# Patient Record
Sex: FEMALE | Race: WHITE | NOT HISPANIC OR LATINO | Employment: UNEMPLOYED | URBAN - METROPOLITAN AREA
[De-identification: names, ages, dates, MRNs, and addresses within clinical notes are randomized per-mention and may not be internally consistent; named-entity substitution may affect disease eponyms.]

---

## 2024-01-25 ENCOUNTER — HOSPITAL ENCOUNTER (EMERGENCY)
Facility: HOSPITAL | Age: 29
Discharge: HOME | End: 2024-01-25
Attending: EMERGENCY MEDICINE
Payer: COMMERCIAL

## 2024-01-25 ENCOUNTER — APPOINTMENT (EMERGENCY)
Dept: CT IMAGING | Facility: HOSPITAL | Age: 29
End: 2024-01-25
Payer: COMMERCIAL

## 2024-01-25 ENCOUNTER — APPOINTMENT (EMERGENCY)
Dept: RADIOLOGY | Facility: HOSPITAL | Age: 29
End: 2024-01-25
Payer: COMMERCIAL

## 2024-01-25 VITALS
SYSTOLIC BLOOD PRESSURE: 141 MMHG | RESPIRATION RATE: 18 BRPM | DIASTOLIC BLOOD PRESSURE: 90 MMHG | OXYGEN SATURATION: 95 % | WEIGHT: 293 LBS | TEMPERATURE: 97.9 F | HEART RATE: 94 BPM

## 2024-01-25 DIAGNOSIS — R56.9 SEIZURE (HCC): Primary | ICD-10-CM

## 2024-01-25 LAB
ALBUMIN SERPL BCP-MCNC: 4.4 G/DL (ref 3.5–5)
ALP SERPL-CCNC: 75 U/L (ref 34–104)
ALT SERPL W P-5'-P-CCNC: 29 U/L (ref 7–52)
ANION GAP SERPL CALCULATED.3IONS-SCNC: 23 MMOL/L
AST SERPL W P-5'-P-CCNC: 26 U/L (ref 13–39)
BACTERIA UR QL AUTO: ABNORMAL /HPF
BASOPHILS # BLD AUTO: 0.04 THOUSANDS/ÂΜL (ref 0–0.1)
BASOPHILS NFR BLD AUTO: 0 % (ref 0–1)
BILIRUB SERPL-MCNC: 0.61 MG/DL (ref 0.2–1)
BILIRUB UR QL STRIP: NEGATIVE
BUN SERPL-MCNC: 9 MG/DL (ref 5–25)
CALCIUM SERPL-MCNC: 9.5 MG/DL (ref 8.4–10.2)
CHLORIDE SERPL-SCNC: 98 MMOL/L (ref 96–108)
CLARITY UR: ABNORMAL
CO2 SERPL-SCNC: 16 MMOL/L (ref 21–32)
COLOR UR: YELLOW
CREAT SERPL-MCNC: 0.87 MG/DL (ref 0.6–1.3)
EOSINOPHIL # BLD AUTO: 0.07 THOUSAND/ÂΜL (ref 0–0.61)
EOSINOPHIL NFR BLD AUTO: 1 % (ref 0–6)
ERYTHROCYTE [DISTWIDTH] IN BLOOD BY AUTOMATED COUNT: 14.2 % (ref 11.6–15.1)
EXT PREGNANCY TEST URINE: NEGATIVE
EXT. CONTROL: NORMAL
GFR SERPL CREATININE-BSD FRML MDRD: 89 ML/MIN/1.73SQ M
GLUCOSE SERPL-MCNC: 140 MG/DL (ref 65–140)
GLUCOSE SERPL-MCNC: 146 MG/DL (ref 65–140)
GLUCOSE UR STRIP-MCNC: NEGATIVE MG/DL
HCT VFR BLD AUTO: 39.9 % (ref 34.8–46.1)
HGB BLD-MCNC: 12.4 G/DL (ref 11.5–15.4)
HGB UR QL STRIP.AUTO: ABNORMAL
IMM GRANULOCYTES # BLD AUTO: 0.14 THOUSAND/UL (ref 0–0.2)
IMM GRANULOCYTES NFR BLD AUTO: 1 % (ref 0–2)
KETONES UR STRIP-MCNC: ABNORMAL MG/DL
LEUKOCYTE ESTERASE UR QL STRIP: NEGATIVE
LYMPHOCYTES # BLD AUTO: 3.78 THOUSANDS/ÂΜL (ref 0.6–4.47)
LYMPHOCYTES NFR BLD AUTO: 30 % (ref 14–44)
MCH RBC QN AUTO: 26.7 PG (ref 26.8–34.3)
MCHC RBC AUTO-ENTMCNC: 31.1 G/DL (ref 31.4–37.4)
MCV RBC AUTO: 86 FL (ref 82–98)
MONOCYTES # BLD AUTO: 1.07 THOUSAND/ÂΜL (ref 0.17–1.22)
MONOCYTES NFR BLD AUTO: 9 % (ref 4–12)
NEUTROPHILS # BLD AUTO: 7.51 THOUSANDS/ÂΜL (ref 1.85–7.62)
NEUTS SEG NFR BLD AUTO: 59 % (ref 43–75)
NITRITE UR QL STRIP: NEGATIVE
NON-SQ EPI CELLS URNS QL MICRO: ABNORMAL /HPF
NRBC BLD AUTO-RTO: 0 /100 WBCS
PH UR STRIP.AUTO: 6 [PH]
PLATELET # BLD AUTO: 428 THOUSANDS/UL (ref 149–390)
PMV BLD AUTO: 11.3 FL (ref 8.9–12.7)
POTASSIUM SERPL-SCNC: 3.6 MMOL/L (ref 3.5–5.3)
PROLACTIN SERPL-MCNC: 87.6 NG/ML (ref 3.34–26.72)
PROT SERPL-MCNC: 8 G/DL (ref 6.4–8.4)
PROT UR STRIP-MCNC: ABNORMAL MG/DL
RBC # BLD AUTO: 4.65 MILLION/UL (ref 3.81–5.12)
RBC #/AREA URNS AUTO: ABNORMAL /HPF
SODIUM SERPL-SCNC: 137 MMOL/L (ref 135–147)
SP GR UR STRIP.AUTO: 1.02 (ref 1–1.03)
UROBILINOGEN UR STRIP-ACNC: <2 MG/DL
WBC # BLD AUTO: 12.61 THOUSAND/UL (ref 4.31–10.16)
WBC #/AREA URNS AUTO: ABNORMAL /HPF

## 2024-01-25 PROCEDURE — 84146 ASSAY OF PROLACTIN: CPT | Performed by: EMERGENCY MEDICINE

## 2024-01-25 PROCEDURE — 36415 COLL VENOUS BLD VENIPUNCTURE: CPT | Performed by: EMERGENCY MEDICINE

## 2024-01-25 PROCEDURE — 81001 URINALYSIS AUTO W/SCOPE: CPT | Performed by: EMERGENCY MEDICINE

## 2024-01-25 PROCEDURE — 82948 REAGENT STRIP/BLOOD GLUCOSE: CPT

## 2024-01-25 PROCEDURE — 85025 COMPLETE CBC W/AUTO DIFF WBC: CPT | Performed by: EMERGENCY MEDICINE

## 2024-01-25 PROCEDURE — 80053 COMPREHEN METABOLIC PANEL: CPT | Performed by: EMERGENCY MEDICINE

## 2024-01-25 PROCEDURE — 70450 CT HEAD/BRAIN W/O DYE: CPT

## 2024-01-25 PROCEDURE — 99285 EMERGENCY DEPT VISIT HI MDM: CPT | Performed by: EMERGENCY MEDICINE

## 2024-01-25 PROCEDURE — 81025 URINE PREGNANCY TEST: CPT | Performed by: EMERGENCY MEDICINE

## 2024-01-25 PROCEDURE — 99284 EMERGENCY DEPT VISIT MOD MDM: CPT

## 2024-01-25 PROCEDURE — 93005 ELECTROCARDIOGRAM TRACING: CPT

## 2024-01-25 PROCEDURE — 71045 X-RAY EXAM CHEST 1 VIEW: CPT

## 2024-01-25 RX ORDER — LEVETIRACETAM 750 MG/1
750 TABLET ORAL 2 TIMES DAILY
Qty: 60 TABLET | Refills: 0 | Status: SHIPPED | OUTPATIENT
Start: 2024-01-25 | End: 2024-02-24

## 2024-01-25 RX ADMIN — LEVETIRACETAM 750 MG: 250 TABLET, FILM COATED ORAL at 10:34

## 2024-01-25 NOTE — ED PROVIDER NOTES
History  Chief Complaint   Patient presents with    Seizure - Prior Hx Of     Patient presents to the ED via EMS, states patient was in the back seat of car with parents driving, states she had a 30 second seizure, EMS reports post ictal upon arrival, patient AAOx4 currently in ED. States hx of seizures, last one when she was 3      28-year-old female presents for evaluation of witnessed seizure that occurred shortly prior to arrival.  Patient with a history of seizures when she was 3 years old and was on medications for approximately 1 year but eventually discontinued it due to patient doing well.  No other known medical problems.  Patient denies any recent illness and otherwise felt well prior to incidence.  Patient was in the backseat of a car with her parents who noticed full body shaking like activity that lasted approximately 30 seconds.  Patient was postictal and now back to baseline.        None       Past Medical History:   Diagnosis Date    Seizure (HCC)        History reviewed. No pertinent surgical history.    History reviewed. No pertinent family history.  I have reviewed and agree with the history as documented.    E-Cigarette/Vaping     E-Cigarette/Vaping Substances     Social History     Tobacco Use    Smoking status: Never    Smokeless tobacco: Never   Substance Use Topics    Alcohol use: Not Currently    Drug use: Not Currently       Review of Systems   Constitutional:  Negative for fever.   Neurological:  Positive for seizures.       Physical Exam  Physical Exam  Vitals and nursing note reviewed.   Constitutional:       Appearance: She is well-developed.   HENT:      Head: Normocephalic and atraumatic.      Right Ear: External ear normal.      Left Ear: External ear normal.      Nose: Nose normal.   Eyes:      General: No scleral icterus.  Cardiovascular:      Rate and Rhythm: Normal rate.   Pulmonary:      Effort: Pulmonary effort is normal. No respiratory distress.   Abdominal:      General:  There is no distension.   Musculoskeletal:         General: No deformity. Normal range of motion.      Cervical back: Normal range of motion.   Skin:     Findings: No rash.   Neurological:      General: No focal deficit present.      Mental Status: She is alert and oriented to person, place, and time.   Psychiatric:         Mood and Affect: Mood normal.         Vital Signs  ED Triage Vitals   Temperature Pulse Respirations Blood Pressure SpO2   01/25/24 0938 01/25/24 0936 01/25/24 0936 01/25/24 0936 01/25/24 0936   97.9 °F (36.6 °C) (!) 140 18 128/64 97 %      Temp Source Heart Rate Source Patient Position - Orthostatic VS BP Location FiO2 (%)   01/25/24 0938 01/25/24 0936 01/25/24 0936 01/25/24 0936 --   Oral Monitor Sitting Right arm       Pain Score       01/25/24 0936       No Pain           Vitals:    01/25/24 0936 01/25/24 1030 01/25/24 1202   BP: 128/64 141/90    Pulse: (!) 140 (!) 117 94   Patient Position - Orthostatic VS: Sitting Sitting          Visual Acuity      ED Medications  Medications   levETIRAcetam (KEPPRA) tablet 750 mg (750 mg Oral Given 1/25/24 1034)       Diagnostic Studies  Results Reviewed       Procedure Component Value Units Date/Time    Urine Microscopic [[id number]]  (Abnormal) Collected: 01/25/24 1028    Lab Status: Final result Specimen: Urine, Clean Catch Updated: 01/25/24 1105     RBC, UA 10-20 /hpf      WBC, UA None Seen /hpf      Epithelial Cells Occasional /hpf      Bacteria, UA None Seen /hpf     UA w Reflex to Microscopic w Reflex to Culture [[id number]]  (Abnormal) Collected: 01/25/24 1028    Lab Status: Final result Specimen: Urine, Clean Catch Updated: 01/25/24 1100     Color, UA Yellow     Clarity, UA Cloudy     Specific Gravity, UA 1.025     pH, UA 6.0     Leukocytes, UA Negative     Nitrite, UA Negative     Protein, UA Trace mg/dl      Glucose, UA Negative mg/dl      Ketones, UA 10 (1+) mg/dl      Urobilinogen, UA <2.0 mg/dl      Bilirubin, UA Negative     Occult Blood, UA  Large    Comprehensive metabolic panel [[id number]]  (Abnormal) Collected: 01/25/24 0949    Lab Status: Final result Specimen: Blood from Arm, Left Updated: 01/25/24 1019     Sodium 137 mmol/L      Potassium 3.6 mmol/L      Chloride 98 mmol/L      CO2 16 mmol/L      ANION GAP 23 mmol/L      BUN 9 mg/dL      Creatinine 0.87 mg/dL      Glucose 140 mg/dL      Calcium 9.5 mg/dL      AST 26 U/L      ALT 29 U/L      Alkaline Phosphatase 75 U/L      Total Protein 8.0 g/dL      Albumin 4.4 g/dL      Total Bilirubin 0.61 mg/dL      eGFR 89 ml/min/1.73sq m     Narrative:      National Kid[employer] Foundation guidelines for Chronic Kidney Disease (CKD):     Stage 1 with normal or high GFR (GFR > 90 mL/min/1.73 square meters)    Stage 2 Mild CKD (GFR = 60-89 mL/min/1.73 square meters)    Stage 3A Moderate CKD (GFR = 45-59 mL/min/1.73 square meters)    Stage 3B Moderate CKD (GFR = 30-44 mL/min/1.73 square meters)    Stage 4 Severe CKD (GFR = 15-29 mL/min/1.73 square meters)    Stage 5 End Stage CKD (GFR <15 mL/min/1.73 square meters)  Note: GFR calculation is accurate only with a steady state creatinine    CBC and differential [[id number]]  (Abnormal) Collected: 01/25/24 0949    Lab Status: Final result Specimen: Blood from Arm, Left Updated: 01/25/24 1005     WBC 12.61 Thousand/uL      RBC 4.65 Million/uL      Hemoglobin 12.4 g/dL      Hematocrit 39.9 %      MCV 86 fL      MCH 26.7 pg      MCHC 31.1 g/dL      RDW 14.2 %      MPV 11.3 fL      Platelets 428 Thousands/uL      nRBC 0 /100 WBCs      Neutrophils Relative 59 %      Immat GRANS % 1 %      Lymphocytes Relative 30 %      Monocytes Relative 9 %      Eosinophils Relative 1 %      Basophils Relative 0 %      Neutrophils Absolute 7.51 Thousands/µL      Immature Grans Absolute 0.14 Thousand/uL      Lymphocytes Absolute 3.78 Thousands/µL      Monocytes Absolute 1.07 Thousand/µL      Eosinophils Absolute 0.07 Thousand/µL      Basophils Absolute 0.04 Thousands/µL     POCT  pregnancy, urine [[id number]]  (Normal) Resulted: 01/25/24 1003    Lab Status: Final result Updated: 01/25/24 1004     EXT Preg Test, Ur Negative     Control Valid    Prolactin [[id number]] Collected: 01/25/24 0949    Lab Status: In process Specimen: Blood from Arm, Left Updated: 01/25/24 1000    Fingerstick Glucose (POCT) [[id number]]  (Abnormal) Collected: 01/25/24 0940    Lab Status: Final result Updated: 01/25/24 0941     POC Glucose 146 mg/dl                    XR chest 1 view portable   Final Result by Noel Tavarez MD (01/25 1100)      No acute cardiopulmonary disease.                  Workstation performed: [id number]         CT head wo contrast   Final Result by Matthew Bueno MD (01/25 1015)      No acute intracranial abnormality.                  Workstation performed: [id number]                    Procedures  Procedures         ED Course                               SBIRT 22yo+      Flowsheet Row Most Recent Value   Initial Alcohol Screen: US AUDIT-C     1. How often do you have a drink containing alcohol? 0 Filed at: 01/25/2024 0938   2. How many drinks containing alcohol do you have on a typical day you are drinking?  0 Filed at: 01/25/2024 0938   3a. Male UNDER 65: How often do you have five or more drinks on one occasion? 0 Filed at: 01/25/2024 0938   3b. FEMALE Any Age, or MALE 65+: How often do you have 4 or more drinks on one occassion? 0 Filed at: 01/25/2024 0938   Audit-C Score 0 Filed at: 01/25/2024 0938   CORA: How many times in the past year have you...    Used an illegal drug or used a prescription medication for non-medical reasons? Never Filed at: 01/25/2024 0938                      Medical Decision Making  28-year-old female presenting with witnessed seizure.  Obtain labs, EKG, pregnancy test, CT head.  Patient does not drive as she does not have a license.    Discussed case with neurology team.  Given prior history of headaches as a child, advised to initiate  antiepileptics Keppra 750 twice daily.  Ambulatory referral placed and will see as outpatient.    Amount and/or Complexity of Data Reviewed  Labs: ordered.  Radiology: ordered.    Risk  Prescription drug management.             Disposition  Final diagnoses:   Seizure (HCC)     Time reflects when diagnosis was documented in both MDM as applicable and the Disposition within this note       Time User Action Codes Description Comment    1/25/2024 12:02 PM Santo Vitale Add [R56.9] Seizure (HCC)           ED Disposition       ED Disposition   Discharge    Condition   Stable    Date/Time   Thu Jan 25, 2024 1202    Comment   Ailyn Nieves discharge to home/self care.                   Follow-up Information       Follow up With Specialties Details Why Contact Info Additional Information    [facility]   [address]  [address]  [facility] [address]  [phone] [facility], [address], [address], [place], [place], [address]   [phone]     [facility] Emergency Department Emergency Medicine  If symptoms worsen [address] [address] [phone] [facility] Emergency Department, [address] [place], [place] [address]            Discharge Medication List as of 1/25/2024 12:03 PM        START taking these medications    Details   levETIRAcetam (Keppra) 750 mg tablet Take 1 tablet (750 mg total) by mouth 2 (two) times a day, Starting Thu 1/25/2024, Until Sat 2/24/2024, Print                 PDMP Review       None            ED Provider  Electronically Signed by             Santo Vitale DO  01/25/24 8722

## 2024-01-26 LAB
ATRIAL RATE: 136 BPM
QRS AXIS: 80 DEGREES
QRSD INTERVAL: 76 MS
QT INTERVAL: 376 MS
QTC INTERVAL: 565 MS
T WAVE AXIS: 41 DEGREES
VENTRICULAR RATE: 136 BPM

## 2024-01-30 ENCOUNTER — TELEPHONE (OUTPATIENT)
Dept: NEUROLOGY | Facility: CLINIC | Age: 29
End: 2024-01-30

## 2024-01-30 NOTE — TELEPHONE ENCOUNTER
Pt parent returned a call regarding a referral that is needed for the insurance for appt coming up. Pt parent stated that the insurance company told her no it is not necessary and that she will not be sending one over since. Pt parent gave me the number to for  to call the insurance for them to explain to us ([phone]). I advised pt parent that just incase to call PCP and have them put a referral in her chart.     Pt parent hung up.     Pt parent number is [phone].

## 2024-02-06 NOTE — TELEPHONE ENCOUNTER
I spoke w/ Paz PATEL @ [facility].  2/6/24 PER PAZ PATEL @ [facility] - A PAPER OR ELECTRONIC REFERRAL IS NOT REQUIRED FOR PT TO SEE A SPECIALIST  REF #: [id number] 2/6/24

## 2024-02-09 ENCOUNTER — TELEPHONE (OUTPATIENT)
Dept: NEUROLOGY | Facility: CLINIC | Age: 29
End: 2024-02-09

## 2024-02-15 ENCOUNTER — CONSULT (OUTPATIENT)
Dept: NEUROLOGY | Facility: CLINIC | Age: 29
End: 2024-02-15
Payer: COMMERCIAL

## 2024-02-15 VITALS
TEMPERATURE: 97.5 F | SYSTOLIC BLOOD PRESSURE: 128 MMHG | HEIGHT: 65 IN | BODY MASS INDEX: 48.82 KG/M2 | OXYGEN SATURATION: 98 % | RESPIRATION RATE: 16 BRPM | WEIGHT: 293 LBS | DIASTOLIC BLOOD PRESSURE: 68 MMHG | HEART RATE: 111 BPM

## 2024-02-15 DIAGNOSIS — G40.409 OTHER GENERALIZED EPILEPSY, NOT INTRACTABLE, WITHOUT STATUS EPILEPTICUS (HCC): Primary | ICD-10-CM

## 2024-02-15 DIAGNOSIS — R56.9 SEIZURE (HCC): ICD-10-CM

## 2024-02-15 PROBLEM — G40.909 EPILEPSY (HCC): Status: ACTIVE | Noted: 2024-02-15

## 2024-02-15 PROCEDURE — 99245 OFF/OP CONSLTJ NEW/EST HI 55: CPT | Performed by: PSYCHIATRY & NEUROLOGY

## 2024-02-15 RX ORDER — LEVETIRACETAM 750 MG/1
750 TABLET ORAL 2 TIMES DAILY
Qty: 180 TABLET | Refills: 1 | Status: SHIPPED | OUTPATIENT
Start: 2024-02-15

## 2024-02-15 NOTE — PATIENT INSTRUCTIONS
Plan:   Epilepsy, suspect underlying generalized epilepsy based on the history of absence and generalized tonic clonic seizures  - continue with levetiracetam 750mg twice a day  - MRI brain study with seizure protocol, with and without contrast, please have the MRI study done on a [employer] Machine.  - check BMP and levetiracetam level prior to MRI brain study  - sleep deprived EEG,  If normal will request for a 24 hours ambulatory EEG study.    Seizure protocol  If she has a seizure that last less than 5 minutes then allow her to recover at home; just get her to the ground for safety.  Call 911 if the following conditions apply  - unwitnessed onset of seizure and there is a potential head injury that needs to be evaluated  - patient stops breathing or turns blue during a seizure  - if the seizure is longer than 5 minutes  - if after the seizure ends and she does not show recovery over the course of 30 minutes.  - if there are multiple seizures in 24 hours  - if the patient refused taking her medications for the past 24 hours  - if there is physical injury from the seizure    I discussed seizure safety and seizure first aid with the patient.  Limits would be no unprotected heights (ladders, standing on chairs/tables), should not swim alone (need partners or ), cooking with a partner to avoid burn injuries, showers instead of baths.  I reviewed that convulsive seizures typically last about 2 minutes with about 15-30 minutes of recovery.  Should a seizure last more than 5 minutes or patient fails to recover after 30 minutes or there are multiple seizures in a day or if there is a suspected head injury then EMS should be called or they go to the nearest emergency room.  If she only experiences altered awareness, confusion, or focal seizures, then they may call the office for guidance.  Seizure first aid consists of preventing the patient from wandering or removal of dangerous objects or prevention of injury, for  "convulsive seizures, position the patient on the ground, may place a pillow under the head, turn the patient to her side, no objects or reaching for the mouth, no restraints but prevent injuries by removing glasses or sharp/heavy objects, and time the duration of the seizure.  I recommend that she obtain a medical alert bracelet that states \"Seizure disorder\" or \"Epilepsy\" along with any medication allergies.    FIRST AID FOR SEIZURES    What to Do If You Witness a Seizure:     Generalized convulsive (called a generalized tonic-clonic or grand mal seizure). During this seizure, the person may cry out, suddenly stiffen up, make jerking movements, and fall.  The person may turn pale or blue from difficulty breathing.    Actions:  Stay calm. Talk in a soothing voice and if possible keep onlookers away.  Prevent injury. Move objects away that the person might hit while jerking uncontrollably.   Time when the seizure starts and ends. Most seizures stop after only a few minutes.  Turn him or her gently onto one side. This will help keep the airway clear.   Never place anything in his/her mouth or give him/her anything by mouth during a seizure.   -- Do not give the person water, pills, or food until fully alert.   Loosen tight clothing or jewelry around his/her neck.  Make the person as comfortable as possible.   Place something soft under their head.   Do not hold the person down. If the person having a seizure thrashes around there is no need for you to restrain them. They are more likely to be combative if restrained. Remember to consider your safety as well.   Keep onlookers away.   Be sensitive and supportive, and ask others to do the same.   Stay with the person until he/she is fully alert.    Complex partial seizure (confusional spells). During this kind of seizure, the person may have a glassy stare; give no response or inappropriate responses when questioned; sit, stand, or walk about aimlessly; make lip smacking " or chewing motions; fidget with clothes; appear to be drunk, drugged, or confused.    Actions:  Make sure the person is safe and won’t harm themselves.  Try to remove harmful objects from around the person (tools, utensils, glasses).  Do NOT be aggressive or attempt to restrain the person. They are more likely to be combative if restrained. Remember to consider your safety as well.  Help prevent the person from wandering, and direct the person to chair or safe position.  Never place anything in his/her mouth or give him/her anything by mouth during a seizure.   -- Do not give the person water, pills, or food until fully alert.   Keep onlookers away.   Be sensitive and supportive, and ask others to do the same.   Stay with the person until he/she is fully alert.    CALL 911 if:  A convulsive seizure lasts more than 5 minutes.  The person turns blue during the seizure.  The person does not start breathing after the seizure. Begin mouth to mouth resuscitation if this would occur.  The person has one seizure right after the other without coming back to normal consciousness between the seizures.  The person has not regained consciousness or is still confused after 30 minutes.  You know the person does not have epilepsy.  You know the person has diabetes or low blood sugar.  The person is pregnant, ill, or injured.   The seizure occurred in water, because the person may have inhaled water.  The person requests an ambulance or medical help.     Rescue medication  Your doctor may prescribe a rescue medication such as lorazepam (Ativan), diazepam (Valium / Diastat), or clonazepam (Klonopin) to terminate a seizure or if you have a history of cluster of seizures.  Follow the instructions given by your doctor for these medications    Recognizing Common Seizures (examples)   Simple partial seizures: Isolated twitching, numbness, sweating, dizziness, nausea/vomiting, disturbances to hearing, vision, smell or taste. No loss of  consciousness occurs, and the person remains aware of his/her environment.   Complex partial seizures: Staring, motionless, picking at clothes, smacking lips, swallowing repeatedly or wandering around. The person is not aware of their surroundings and is not fully responsive.  Atonic seizures: “Drop attacks” or sudden, rapid fall to ground with rapid recovery.   Myoclonic seizures: Brief forceful jerks which can affect the whole body or just part of it.   Absence seizures: May appear to be daydreaming or “spacing out.” The person is momentarily unresponsive and unaware of what is happening around him/her.   Tonic seizures: Stiffening of part or of the entire body.  Generalized Tonic-Clonic Seizures. “Grand-mal seizure.” Sudden loss of consciousness with body stiffening followed by continuous jerking movements. A blue tinge around the mouth is likely but lack of oxygen is rare. Loss of bladder and/or bowel control may occur.    SEIZURE SAFETY    Don’t let fear of seizures keep you at home. Be smart about your activities to make sure you are safe. The guidelines below can help you be as safe as possible.    Make sure the people around you are aware of:  What happens when you have a seizure  Correct seizure first aid  First aid for choking (consider taking a basic life support class)  When they should know to call 911 or for medical help    Avoid common triggers of seizures:  Missing your medications  Not getting enough sleep  Drinking alcohol  Using illegal drugs    Safety measures for at home:  In the bathroom:   Do not take baths in the bathtub. Instead, take only showers. Try to have a family member available while you are in the shower.  Make sure the drain in the bathtub/shower is working properly to avoid pooling of water.  You can consider a fitted shower seat. Recessed soap trays can minimize injury if you would happen to fall in the shower.   Bathroom doors can be hung to open outwards, so that the door can  still be opened if you fall against the door.   Do not lock the bathroom door. Use an “Occupied” sign on the door or other signal to let others know you are in the bathroom.  Safety locks can be obtained from the [employer].  On your water heater, set your water temperature to a warm temperature that is not scalding to avoid burns from very hot water.   Put non-skid strips/ in the bathtub.  Use an electric shaver.  Avoid any electrical appliances (including electric shaver) in the bathroom or near water.  Use shatterproof glass for mirrors.    In the kitchen:   When possible, cook using a microwave.  Only cook or use electrical appliances when someone else is in the house and available.   As much as possible, grill food and avoid fryers or frying food.  Use the back burners of the stove and turn the pot handles toward the back of the stove.  Avoid carrying hot pans, pots of boiling water, or very hot food. Serve food or liquids directly from the stove. At the least, minimize the distance hot food is carried.   Use precut foods or food processers to limit the need to use knives.   Use plastic or durable cups, dishes, and containers instead of breakable glass items.    In the bedroom  Low level and wide beds (like a futon) can reduce risk of injury of falling out of bed. If there is a high risk of falling out of bed, you can consider simply putting the mattress on the floor.  Avoid sleeping on top bunks of bunk beds.   Place a soft carpet on the floor.    Around the house  Pad sharp corners of tables, chairs, etc. Round tables and furniture can be considered to avoid sharp corners.   Avoid open flames. Place a screen in front of fireplaces and don’t build a fire alone.   Allow for open spaces with furniture.  Avoid loose throw rugs or slippery floors. Non-slip karl or cushioned karl can help reduce injury from a fall.   Fireproof fabrics and furniture are best, and especially important if  you smoke.  Doors and windows with safety glass are safer if someone falls against them.  Avoid lights and heaters that could easily be knocked over.  Use curling irons or clothing irons that have automatic shut off switches.  Make sure motor driven equipment or lawn mowers have “dead man’s” handles or seats so they will turn off if you release pressure.    Safety measures when away from home  Driving  [place] law mandates that you cannot drive for 6 months after your most recent seizure.   [place] law mandates that you cannot drive for 6 months after your most recent seizure.    Work/Travel  Wear a medical alert bracelet or necklace at all times.  Wear a helmet and use protective clothing/equipment when appropriate.  Consider telling co-workers and travel companions that you have epilepsy and what to do if you have a seizure.  Avoid irregular shifts or disruptions of a regular sleep pattern.  Take your medications on time and keep an updated list of medications in your purse or wallet.  Do not climb to heights or operate heavy machinery.  Stand back from the edges of roads or bus/train platforms when traveling.  If you wander when you have a seizure, take a friend along for the trip.    Recreation  Swimming can be dangerous. Do not swim alone, in open water, or in murky water that you cannot see the bottom.   Caregivers should be with you in the pool at all times and must be physically able to get you out of the water.  Use a flotation device.   Scuba diving is not recommended since during a seizure people are unaware of their surroundings.  Having a seizure underwater can be deadly and can endanger the lives of others.  Kayaking and canoeing can be especially dangerous  People with epilepsy are at a higher risk of becoming trapped underneath a canoe or kayak.  Wear a helmet when playing contact sports, biking, or if there is a risk of falling.  Patients with epilepsy are at a higher risk of head injury  when playing contact sports.  Avoid riding a bike, running, or other activities around busy roads, steep hills, or secluded areas.   Exercise on soft surfaces.  Theme [place]: many people with epilepsy can go on rides depending on their type of seizures.  For some people, stress or excitement can trigger a seizure.   Rollercoasters (especially if you are upside-down) are more dangerous for people with epilepsy.      Medications  Take your medications on time. If you have trouble remembering, set alarms on your phone. You can visit [email or web address] to set up reminders through text message to help you remember to take your medications.  Use a pillbox to help you keep track of your medications.  When out of the house, take any needed medications with you. Consider keeping one or two extra doses with you in case you are unexpectedly away from home longer than planned.  If you realize you missed a dose of your medications and it is less than 2 hours until your next dose, skip the missed dose. Do not double up your medication dose. If it is more than 2 hours until your next dose, you can take the missed dose.   Avoid drinking alcohol, since this can enhance effects of your seizure medications.  Be aware of common and major side effects of your medications.  Keep your medications out of the reach of children.    Parenting:  Childproof your home as much as possible.  It is possible that you could drop your baby if you have a seizure while holding or feeding them.  If you are nursing a baby, sit on the floor or bed with your back supported so the baby will not fall far if you should lose consciousness.  Feed the baby while he or she is seated in an infant seat.  Dress, change, and sponge bathe the baby on the floor.  Move the baby around in a stroller or small crib.  Keep a young baby in a playpen when you are alone, and a toddler in an indoor play yard, or childproof one room and use safety ferrell at the doors.  When  out of the house, use a bungee-type cord or restraint harness so your child cannot wander away if you have a seizure that affects your awareness.

## 2024-02-15 NOTE — PROGRESS NOTES
[facility]  Patient's Name: Ailyn Nieves   Patient's : 1995   Visit Type: consultation  Referring MD / PCP:  No primary care provider on file.    Assessment:  Ms. Ailyn Nieves is a 28 y.o. 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  This is based on her history of having had absence and generalized tonic clonic seizures when she was a child and a recent breakthrough seizure.  Although, her seizures from childhood to the one a couple of weeks ago was  by more than 20 years, she may have had a resolved epilepsy and we should consider that this new seizure requires a reassessment for an underlying structural cause for her seizure.  She will need an MRI brain seizure protocol study and EEG studies.    I reviewed management of epilepsy starts with antiseizure medications, seizure safety and seizure first aid instructions.  Her father seems to be well educated as to what to do during an acute seizure.  She does not drive (never had a 's license).    I discussed seizure safety and seizure first aid with the patient.  Limits would be no unprotected heights (ladders, standing on chairs/tables), should not swim alone (need partners or ), cooking with a partner to avoid burn injuries, showers instead of baths.  I reviewed that convulsive seizures typically last about 2 minutes with about 15-30 minutes of recovery.  Should a seizure last more than 5 minutes or patient fails to recover after 30 minutes or there are multiple seizures in a day or if there is a suspected head injury then EMS should be called or they go to the nearest emergency room.  If she only experiences altered awareness, confusion, or focal seizures, then they may call the office for guidance.  Seizure first aid consists of preventing the patient from wandering or removal of dangerous objects or prevention of injury, for convulsive seizures, position the patient  "on the ground, may place a pillow under the head, turn the patient to her side, no objects or reaching for the mouth, no restraints but prevent injuries by removing glasses or sharp/heavy objects, and time the duration of the seizure.  I recommend that she obtain a medical alert bracelet that states \"Seizure disorder\" or \"Epilepsy\" along with any medication allergies.    Women of childbearing age should be on folic acid supplementation, but she reports that she is currently abstinent, levetiracetam generally has a lower risk for congenital malformations compared to other antiseizure medications.    Plan:   Epilepsy, suspect underlying generalized epilepsy based on the history of absence and generalized tonic clonic seizures  - continue with levetiracetam 750mg twice a day  - MRI brain study with seizure protocol, with and without contrast, please have the MRI study done on a 3[employer]es[employer] Machine.  - check BMP and levetiracetam level prior to MRI brain study  - sleep deprived EEG,  If normal will request for a 24 hours ambulatory EEG study.    Seizure protocol  If she has a seizure that last less than 5 minutes then allow her to recover at home; just get her to the ground for safety.  Call 911 if the following conditions apply  - unwitnessed onset of seizure and there is a potential head injury that needs to be evaluated  - patient stops breathing or turns blue during a seizure  - if the seizure is longer than 5 minutes  - if after the seizure ends and she does not show recovery over the course of 30 minutes.  - if there are multiple seizures in 24 hours  - if the patient refused taking her medications for the past 24 hours  - if there is physical injury from the seizure    Follow-up in 3 monhts      Problem List Items Addressed This Visit          Nervous and Auditory    Epilepsy (HCC) - Primary    Relevant Medications    levETIRAcetam (Keppra) 750 mg tablet    Other Relevant Orders    MRI brain seizure wo and w contrast " "   EEG Sleep deprived    Levetiracetam level    Basic metabolic panel     Other Visit Diagnoses       Seizure (HCC)        Relevant Medications    levETIRAcetam (Keppra) 750 mg tablet                Chief Complaint:    Chief Complaint   Patient presents with    New Patient Visit    Seizures      HPI:      Ailyn Nieves is a 28 y.o. right handed female here for consultation evaluation of seizure.     Intake History 2/15/2024  She had a seizure on 1/25/2024, presented to [facility] ED - she has a history of seizures when she was 3 years old, but not on antiseizure medication.  Her parents witnessed a full body shaking seizure for 30 seconds.    Her father recalled that when she was 3 years old she had 20-30 seizures a day, these were described as staring \"black-out\" or \"blank-out\", blank look on her face, behavioral arrest, or she would have a head drop with her seizure.  There were a few grand mal seizures.  These grand mal seizures do not start from the blank-out look but she goes into a convulsion.  She was initially on a medication for about year, then when she turned [age] years old, the seizures stopped.  She was seen by Dr. Corbin at [facility] in [place].  She was admitted to hospital for a week and a half to two weeks with frequent seizures on video EEG monitoring.  There was a question about surgical intervention but Dr. Corbin was against the idea.    Between 4 years of age and to 2023, there have been no seizures, no periods of staring off or myoclonic jerking activity.      On 1/25/2024, she was with her father, driving home, she suddenly tensed up and went into a grand mal seizure.  She was foaming from the mouth, lips were blue, when she came out of it she had a blank stare.  The night before she recalled that she did not sleep well.  But there were no multiple days of sleep deprivation.  There are no drugs, alcohol, or new medications around the time.      The patient denies " any history of myoclonus, automatisms, unexplained hyperkinetic behaviors, olfactory / gustatory hallucinations, epigastric rising events, esperanza vu events, visual hallucinations, unexplained nocturnal enuresis, or nocturnal tongue biting.    AED/side effects/compliance:  Levetiracetam 750-750  No side effects  Good compliance    Event/Seizure semiology:  Generalized convulsion  History of absence seizures    Woman of childbearing age with Epilepsy:  Contraception: abstinent   Folic acid supplement:  No    Prior Epilepsy History:  x    Special Features  Status epilepticus: No  Self Injury Seizures: No  Precipitating Factors: None  Post-ictal state: 3-4 days of fatigue, slurred speech    Epilepsy Risk Factors:  Abnormal pregnancy: No  Abnormal birth/: No  Abnormal Development: No  Febrile seizures, simple: No  Febrile seizures, complex: No  CNS infection: No  Intellectual disability: No  Cerebral palsy: No  Head injury (moderate/severe): No  CNS neoplasm: No  CNS malformation: No  Neurosurgical procedure: No  Stroke: No  CNS autoimmune disorder: No  Alcohol abuse: No  Drug abuse: No  Family history Sz/epilepsy: sister with one febrile seizure    Prior AEDs:  medication Max dose Time used Reason to stop   carbamazepine   Allergy          Her father is not able to recall what medications she was put on when she was a child    Prior workup:  I reviewed the CT head study myself  [id number] - CT head  No acute pathology    EEGs:  None available    Labs:  Component      Latest Ref Rng 2024   WBC      4.31 - 10.16 Thousand/uL 12.61 (H)    RBC      3.81 - 5.12 Million/uL 4.65    Hemoglobin      11.5 - 15.4 g/dL 12.4    Hematocrit      34.8 - 46.1 % 39.9    Platelet Count      149 - 390 Thousands/uL 428 (H)    Sodium      135 - 147 mmol/L 137    Potassium      3.5 - 5.3 mmol/L 3.6    Chloride      96 - 108 mmol/L 98    Carbon Dioxide      21 - 32 mmol/L 16 (L)    ANION GAP      mmol/L 23    BUN      5 - 25  "mg/dL 9    Creatinine      0.60 - 1.30 mg/dL 0.87    GLUCOSE      65 - 140 mg/dL 140    Calcium      8.4 - 10.2 mg/dL 9.5    AST      13 - 39 U/L 26    ALT      7 - 52 U/L 29    ALK PHOS      34 - 104 U/L 75    Total Protein      6.4 - 8.4 g/dL 8.0    Albumin      3.5 - 5.0 g/dL 4.4    Total Bilirubin      0.20 - 1.00 mg/dL 0.61    GFR, Calculated      ml/min/1.73sq m 89    PREGNANCY TEST URINE Negative    Control Valid    PROLACTIN      3.34 - 26.72 ng/mL 87.60 (H)         General exam   /68   Pulse (!) 111   Temp 97.5 °F (36.4 °C) (Temporal)   Resp 16   Ht 5' 5\" (1.651 m)   Wt (!) 154 kg (339 lb 9.6 oz)   LMP 01/18/2024 (Approximate)   SpO2 98%   BMI 56.51 kg/m²    Appearance:  facial hirsuitism and normally developed, appears well  Carotids: no bruits present  Cardiovascular:  palpitation, regular rate and rhythm, and normal heart sounds  Pulmonary: clear to auscultation  Abdominal: soft, obese  Extremities: no edema    HEENT: anicteric and moist mucus membranes / oral cavity   Fundoscopy: bilateral optic discs are sharp    Mental status  Orientation: intact and oriented to name, place, time  Fund of Knowledge: intact   Attention and Concentration: able to spell HOUSE forwards and backwards  Current and Remote Memory:recalled 3/3 words after five minutes  Language: spontaneous speech is normal and comprehension is intact    Cranial Nerves  CN 1: not tested  CN 2: Visual fields intact to confrontation and pupils equal round reactive to direct and consenual light   CN 3, 4, 6: EOMI, no nystagmus  CN 5:sensation intact to all distribution V1, V2, V3  CN 7:muscles of facial expression are symmetric  CN 8:symmetric to finger rubs bilaterally  CN 9, 10:no dysarthria present  CN 11:symmetric strength of sternocleidomastoid and trapezius muscles  CN 12:tongue is midline    Motor:  Bulk, Tone: normal bulk, normal tone  Pronation: no pronator drift  Strength: Patient has full strength symmetrically of " shoulder abduction, biceps, triceps, wrist flexion, wrist extension, finger flexion, finger abduction, hip flexion, knee flexion, knee extension, dorsiflexion, plantar flexion.   Abnormal movements: physiologic tremor present    Sensory:  Lighttouch: intact in all limbs  Romberg:normal    Coordination:  FNF:FNF bilaterally intact  JAIRO:intact  FFM:intact  Gait/Station:normal gait and normal tandem gait    Reflexes:  DTR 0/4 of the biceps, brachioradialis, triceps, patellar, and Achilles bilateral    Past Medical/Surgical History:  Patient Active Problem List   Diagnosis    Epilepsy (HCC)     Past Medical History:   Diagnosis Date    Seizure (HCC)      History reviewed. No pertinent surgical history.    Past Psychiatric History:  Depression: No  Anxiety: No  Psychosis: No    Medications:    Current Outpatient Medications:     levETIRAcetam (Keppra) 750 mg tablet, Take 1 tablet (750 mg total) by mouth 2 (two) times a day, Disp: 180 tablet, Rfl: 1    Allergies:  Allergies   Allergen Reactions    Tegretol [Carbamazepine] Rash     Other         Family history:  Family History   Problem Relation Age of Onset    Depression Mother     Anxiety disorder Mother     Kidney disease Father     Febrile seizures Sister        Social History  Living situation:  Lives with parents  Work:  Graduated high school, no currently employed  Driving:  No 's license (not interested in getting a 's license)   reports that she has never smoked. She has never used smokeless tobacco. She reports that she does not currently use alcohol. She reports that she does not currently use drugs.    Review of Systems  A review of at least 12 organ/systems was obtained by the medical assistant and reviewed by me, including additional positives/negatives:  Neurological:  Positive for seizures (1/25/24 for 4 mins when she was in car . dad call ambulance hazy)   Decision making was of high-complexity due to the patient's high risk condition  (seizures), psychiatric and neuropsychological comorbidities, behavioral problems, memory and cognitive problems and medication side effects.

## 2024-02-15 NOTE — PROGRESS NOTES
Review of Systems   Constitutional:  Negative for appetite change, fatigue and fever.   HENT: Negative.  Negative for hearing loss, tinnitus, trouble swallowing and voice change.    Eyes: Negative.  Negative for photophobia, pain and visual disturbance.   Respiratory: Negative.  Negative for shortness of breath.    Cardiovascular: Negative.  Negative for palpitations.   Gastrointestinal: Negative.  Negative for nausea and vomiting.   Endocrine: Negative.  Negative for cold intolerance.   Genitourinary: Negative.  Negative for dysuria, frequency and urgency.   Musculoskeletal:  Negative for back pain, gait problem, myalgias, neck pain and neck stiffness.   Skin: Negative.  Negative for rash.   Allergic/Immunologic: Negative.    Neurological:  Positive for seizures (1/25/24 for 4 mins when she was in car . dad call ambulance hazy). Negative for dizziness, tremors, syncope, facial asymmetry, speech difficulty, weakness, light-headedness, numbness and headaches.   Hematological: Negative.  Does not bruise/bleed easily.   Psychiatric/Behavioral: Negative.  Negative for confusion, hallucinations and sleep disturbance.    All other systems reviewed and are negative.

## 2024-02-26 LAB
BUN SERPL-MCNC: 7 MG/DL (ref 6–20)
BUN/CREAT SERPL: 9 (ref 9–23)
CALCIUM SERPL-MCNC: 9.1 MG/DL (ref 8.7–10.2)
CHLORIDE SERPL-SCNC: 103 MMOL/L (ref 96–106)
CO2 SERPL-SCNC: 23 MMOL/L (ref 20–29)
CREAT SERPL-MCNC: 0.79 MG/DL (ref 0.57–1)
EGFR: 103 ML/MIN/1.73
GLUCOSE SERPL-MCNC: 98 MG/DL (ref 70–99)
LEVETIRACETAM SERPL-MCNC: 17 UG/ML (ref 10–40)
POTASSIUM SERPL-SCNC: 4.3 MMOL/L (ref 3.5–5.2)
SODIUM SERPL-SCNC: 143 MMOL/L (ref 134–144)

## 2024-03-11 ENCOUNTER — PATIENT MESSAGE (OUTPATIENT)
Dept: NEUROLOGY | Facility: CLINIC | Age: 29
End: 2024-03-11

## 2024-03-11 ENCOUNTER — TELEPHONE (OUTPATIENT)
Dept: NEUROLOGY | Facility: CLINIC | Age: 29
End: 2024-03-11

## 2024-03-13 ENCOUNTER — HOSPITAL ENCOUNTER (OUTPATIENT)
Dept: HOSPITAL 99 - MRI | Age: 29
End: 2024-03-13
Payer: COMMERCIAL

## 2024-03-13 DIAGNOSIS — G40.409: Primary | ICD-10-CM

## 2024-03-13 PROCEDURE — A9575 INJ GADOTERATE MEGLUMI 0.1ML: HCPCS

## 2024-03-13 NOTE — PATIENT COMMUNICATION
Recd  3/11 11:31 AM     Roe smith calling for tia smith.  She is my daughter. Her birthday is 1995. And we're having problems with the prescription that you gave us to have her MRI done. If you could give me a call back so we can get this resolved dany she has appointment tomorrow morning to get it done. Again, my number is [phone]. Thank you.  _________  Already addressed.

## 2024-03-20 ENCOUNTER — TELEPHONE (OUTPATIENT)
Dept: NEUROLOGY | Facility: CLINIC | Age: 29
End: 2024-03-20

## 2024-03-20 NOTE — TELEPHONE ENCOUNTER
Patient's father came into the [place] [facility] to drop off a disc from [facility] with MR Brain W WO Contrast from 3/13/24.  Disc was given to Shawna HESS to upload.  Patient's father would like to pick disc up once complete.  Please contact patient once complete at [phone].

## 2024-03-20 NOTE — TELEPHONE ENCOUNTER
LVMM for father to let him know disc has been downloaded and is ready for him to . Told him I would be available until 4:00pm today and gave direct #

## 2024-03-21 NOTE — TELEPHONE ENCOUNTER
SOHAM that disc was ready to be picked up at the [place] [facility] and we would be in the office until 4:00 pm today 3/21/24 and from 8:00 am -12:00 pm on Friday 3/22/24.

## 2024-04-24 ENCOUNTER — HOSPITAL ENCOUNTER (OUTPATIENT)
Dept: NEUROLOGY | Facility: HOSPITAL | Age: 29
Discharge: HOME | End: 2024-04-24
Payer: COMMERCIAL

## 2024-04-24 DIAGNOSIS — G40.909 NONINTRACTABLE EPILEPSY WITHOUT STATUS EPILEPTICUS, UNSPECIFIED EPILEPSY TYPE (HCC): ICD-10-CM

## 2024-04-24 PROCEDURE — 95819 EEG AWAKE AND ASLEEP: CPT

## 2024-04-24 PROCEDURE — 95819 EEG AWAKE AND ASLEEP: CPT | Performed by: PSYCHIATRY & NEUROLOGY

## 2024-04-25 ENCOUNTER — TELEPHONE (OUTPATIENT)
Dept: NEUROLOGY | Facility: CLINIC | Age: 29
End: 2024-04-25

## 2024-04-25 DIAGNOSIS — G40.909 NONINTRACTABLE EPILEPSY WITHOUT STATUS EPILEPTICUS, UNSPECIFIED EPILEPSY TYPE (HCC): Primary | ICD-10-CM

## 2024-04-25 NOTE — TELEPHONE ENCOUNTER
----- Message from Shamar Pedro MD sent at 4/24/2024  2:48 PM EDT -----  Normal EEG study (does not rule out seizure disorder or epilepsy).  Recommend 24 hours ambulatory EEG study.

## 2024-04-26 NOTE — TELEPHONE ENCOUNTER
[facility] (supporting Shamar Pedro MD)       4/25/24  8:51 AM  Good Morning Dr. Pedro,  I saw that you got the results from my Sleep deprived EEG yesterday and you are now recommending me to have the ambulatory one done. How can I get that scheduled and can it be done here at one of the [facility]?     Have a good day, thank you.  - tia smith

## 2024-04-26 NOTE — TELEPHONE ENCOUNTER
Ambulatory EEG 24 hours ordered.  Ambulatory EEG studies are only done from the [place] site.    She could use the script at other hospital systems, if they have ambulatory EEG services available closer to where she lives.

## 2024-04-30 NOTE — TELEPHONE ENCOUNTER
Pt stated the [facility] can do the 24 hr EEG. Pt just needs an order# to give to the facility.     [phone]

## 2024-05-15 ENCOUNTER — HOSPITAL ENCOUNTER (OUTPATIENT)
Dept: HOSPITAL 99 - EEG | Age: 29
End: 2024-05-15
Payer: COMMERCIAL

## 2024-05-15 DIAGNOSIS — G40.909: Primary | ICD-10-CM

## 2024-06-18 ENCOUNTER — TELEPHONE (OUTPATIENT)
Dept: NEUROLOGY | Facility: CLINIC | Age: 29
End: 2024-06-18

## 2024-06-18 NOTE — TELEPHONE ENCOUNTER
Made an appt reminder call no answer lmom. Appt with dr Pedro on 6/27/24 @10:00am  in [facility].

## 2024-06-27 ENCOUNTER — TELEMEDICINE (OUTPATIENT)
Dept: NEUROLOGY | Facility: CLINIC | Age: 29
End: 2024-06-27
Payer: COMMERCIAL

## 2024-06-27 VITALS — WEIGHT: 293 LBS | BODY MASS INDEX: 48.82 KG/M2 | HEIGHT: 65 IN

## 2024-06-27 DIAGNOSIS — R56.9 SEIZURE (HCC): ICD-10-CM

## 2024-06-27 DIAGNOSIS — G40.909 NONINTRACTABLE EPILEPSY WITHOUT STATUS EPILEPTICUS, UNSPECIFIED EPILEPSY TYPE (HCC): Primary | ICD-10-CM

## 2024-06-27 PROCEDURE — 99213 OFFICE O/P EST LOW 20 MIN: CPT | Performed by: PSYCHIATRY & NEUROLOGY

## 2024-06-27 RX ORDER — LEVETIRACETAM 750 MG/1
750 TABLET ORAL 2 TIMES DAILY
Qty: 60 TABLET | Refills: 11 | Status: SHIPPED | OUTPATIENT
Start: 2024-06-27

## 2024-06-27 NOTE — PROGRESS NOTES
Neurology Ambulatory Visit  Name: Ailyn Nieves       : 1995       MRN: [id number]   Encounter Provider: Shamar Pedro MD   Encounter Date: 2024  Encounter department: [facility]  Referring Provider/PCP: No primary care provider on file.     Virtual Regular Visit         Reason for visit is epilepsy    Provider located at [place] [id number] [address] [address]  [phone]    Recent Visits  Date Type Provider Dept   24 Telemedicine Shamar Pedro MD [facility]   Showing recent visits within past 7 days and meeting all other requirements  Future Appointments  No visits were found meeting these conditions.  Showing future appointments within next 150 days and meeting all other requirements    Verification of patient location:    Patient is located in the following state in which I hold an active license [place].    The patient was identified by name and date of birth.  The patient is located at [place], [place].  Ailyn Nieves was informed that this is a telemedicine visit and that the visit is being conducted through the Epic Embedded platform. She agrees to proceed..  My office door was closed. No one else was in the room.  She acknowledged consent and understanding of privacy and security of the video platform. The patient has agreed to participate and understands she can discontinue the visit at any time.    Patient is aware this is a billable service.     VIRTUAL VISIT DISCLAIMER    Ailyn Nieves acknowledges that she has consented to an online visit or consultation. She understands that the online visit is based solely on information provided by her, and that, in the absence of a face-to-face physical evaluation by the physician, the diagnosis she receives is both limited and provisional in terms of accuracy and completeness. This is not intended to replace a full medical  face-to-face evaluation by the physician. Ailyn Nieves understands and accepts these terms.     Assessment:  Ms. Aliyn Nieves is a 29 y.o. 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  This is based on her history of having had absence and generalized tonic clonic seizures when she was a child and a recent breakthrough seizure.  She would have qualified for resolved childhood epilepsy given that she had been off of antiseizure medication for more than 5 years, but it was more than 20 years from her last seizure.  With a recurrence of a generalized tonic clonic seizure, it is probable that she is at risk for recurrent seizures.  She is tolerating levetiracetam without significant side effects.      Plan:   Epilepsy  - continue with levetiracetam 750mg twice a day  - call the office if you have a breakthrough seizure, convulsion, staring spell, or body jerking activity    Women of childbearing age should be on folic acid supplementation, but she reports that she is currently abstinent, levetiracetam generally has a lower risk for congenital malformations compared to other antiseizure medications.    Seizure protocol  If she has a seizure that last less than 5 minutes then allow her to recover at home; just get her to the ground for safety.  Call 911 if the following conditions apply  - unwitnessed onset of seizure and there is a potential head injury that needs to be evaluated  - patient stops breathing or turns blue during a seizure  - if the seizure is longer than 5 minutes  - if after the seizure ends and she does not show recovery over the course of 30 minutes.  - if there are multiple seizures in 24 hours  - if the patient refused taking her medications for the past 24 hours  - if there is physical injury from the seizure    Follow-up in 1 year.        Problem List Items Addressed This Visit          Nervous and Auditory    Epilepsy (HCC) - Primary       - continue with  "levetiracetam 750mg twice a day  - call the office if you have a breakthrough seizure, convulsion, staring spell, or body jerking activity         Relevant Medications    levETIRAcetam (Keppra) 750 mg tablet     Other Visit Diagnoses       Seizure (HCC)        Relevant Medications    levETIRAcetam (Keppra) 750 mg tablet            Chief Complaint:    Chief Complaint   Patient presents with    Follow-up    Seizures      HPI:    Ailyn Nieves is a 29 y.o. right handed female here for follow-up evaluation of seizure.     Interval History 6/27/2024  She reports that she has had no seizures, myoclonic jerking, and absence type of seizures since her last visit.  She confirms that she has not had a seizure since 1/25/2024.  She takes her medication regularly, no missed doses, and no side effects.    AED/side effects/compliance:  Levetiracetam 750-750  No side effects    Event/Seizure semiology:  Generalized convulsion  History of absence seizures    Woman of childbearing age with Epilepsy:  Contraception: abstinent   Folic acid supplement:  No    Prior Epilepsy History:  Intake History 2/15/2024  -750.  She had a seizure on 1/25/2024, presented to [facility] ED - she has a history of seizures when she was 3 years old, but not on antiseizure medication.  Her parents witnessed a full body shaking seizure for 30 seconds.    Her father recalled that when she was 3 years old she had 20-30 seizures a day, these were described as staring \"black-out\" or \"blank-out\", blank look on her face, behavioral arrest, or she would have a head drop with her seizure.  There were a few grand mal seizures.  These grand mal seizures do not start from the blank-out look but she goes into a convulsion.  She was initially on a medication for about year, then when she turned [age] years old, the seizures stopped.  She was seen by Dr. Corbin at [facility] in [place].  She was admitted to hospital for a week and a half " to two weeks with frequent seizures on video EEG monitoring.  There was a question about surgical intervention but Dr. Corbin was against the idea.    Between 4 years of age and to , there have been no seizures, no periods of staring off or myoclonic jerking activity.      On 2024, she was with her father, driving home, she suddenly tensed up and went into a grand mal seizure.  She was foaming from the mouth, lips were blue, when she came out of it she had a blank stare.  The night before she recalled that she did not sleep well.  But there were no multiple days of sleep deprivation.  There are no drugs, alcohol, or new medications around the time.      The patient denies any history of myoclonus, automatisms, unexplained hyperkinetic behaviors, olfactory / gustatory hallucinations, epigastric rising events, esperanza vu events, visual hallucinations, unexplained nocturnal enuresis, or nocturnal tongue biting.    Special Features  Status epilepticus: No  Self Injury Seizures: No  Precipitating Factors: None  Post-ictal state: 3-4 days of fatigue, slurred speech    Epilepsy Risk Factors:  Abnormal pregnancy: No  Abnormal birth/: No  Abnormal Development: No  Febrile seizures, simple: No  Febrile seizures, complex: No  CNS infection: No  Intellectual disability: No  Cerebral palsy: No  Head injury (moderate/severe): No  CNS neoplasm: No  CNS malformation: No  Neurosurgical procedure: No  Stroke: No  CNS autoimmune disorder: No  Alcohol abuse: No  Drug abuse: No  Family history Sz/epilepsy: sister with one febrile seizure    Prior AEDs:  medication Max dose Time used Reason to stop   carbamazepine   Allergy          Her father is not able to recall what medications she was put on when she was a child    Prior workup:  x  Imagin2024 - CT head  No acute pathology    3/13/2024 - MRI brain [facility])  No intracranial abnormality  Maxillary sinusitis    EEGs:  2024   Normal awake and  "drowsy EEG    5/15/2024 - 24 hours ambulatory EEG ([facility])  Normal awake and asleep EEG    Labs:  Component      Latest Ref Rng 2/23/2024   GLUCOSE      70 - 99 mg/dL 98    BUN      6 - 20 mg/dL 7    Creatinine      0.57 - 1.00 mg/dL 0.79    GFR, Calculated      >59 mL/min/1.73 103    SL AMB BUN/CREATININE RATIO      9 - 23  9    Sodium      134 - 144 mmol/L 143    Potassium      3.5 - 5.2 mmol/L 4.3    Chloride      96 - 106 mmol/L 103    Carbon Dioxide      20 - 29 mmol/L 23    CALCIUM      8.7 - 10.2 mg/dL 9.1    LEVETIRACETA (KEPPRA)      10.0 - 40.0 ug/mL 17.0      Video Exam  General exam   Ht 5' 5\" (1.651 m)   Wt (!) 154 kg (339 lb)   BMI 56.41 kg/m²    Appearance:  facial hirsuitism and normally developed, appears well  Carotids: not assessed  Cardiovascular: not assessed  Pulmonary: not assessed due to virtual visit    HEENT: anicteric   Fundoscopy: not assessed    Mental status  Orientation: intact and oriented to name, place, time  Fund of Knowledge: intact   Attention and Concentration: intact  Current and Remote Memory:intact and recalled 3/3 words after five minutes  Language: spontaneous speech is normal and comprehension is intact    Cranial Nerves  CN 1: not tested  CN 2: not assessed   CN 3, 4, 6: EOMI, no nystagmus  CN 5:not assessed  CN 7:muscles of facial expression are symmetric  CN 8:not assessed  CN 9, 10:no dysarthria present  CN 11:symmetric strength of sternocleidomastoid and trapezius muscles  CN 12:tongue is midline    Motor:  Bulk, Tone: normal bulk  Pronation: not assessed  Strength: Symmetric strength of the arms and legs, no lateralizing weakness   Abnormal movements: not seen    Sensory:  Lighttouch: not assessed  Romberg:not assessed    Coordination:  FNF:FNF bilaterally intact  JAIRO:intact  FFM:intact  Gait/Station:normal gait and normal tandem gait    Reflexes:  Not assessed    Past Medical/Surgical History:  Patient Active Problem List   Diagnosis    Epilepsy (HCC) "     Past Medical History:   Diagnosis Date    Seizure (HCC)     Seizures (HCC)      History reviewed. No pertinent surgical history.    Past Psychiatric History:  Depression: No  Anxiety: No  Psychosis: No    Medications:    Current Outpatient Medications:     levETIRAcetam (Keppra) 750 mg tablet, Take 1 tablet (750 mg total) by mouth 2 (two) times a day, Disp: 60 tablet, Rfl: 11    Allergies:  Allergies   Allergen Reactions    Tegretol [Carbamazepine] Rash     Other         Family history:  Family History   Problem Relation Age of Onset    Depression Mother     Anxiety disorder Mother     Kidney disease Father     Febrile seizures Sister        Social History  Living situation:  Lives with parents  Work:  Graduated high school, no currently employed  Driving:  No 's license (not interested in getting a 's license)   reports that she has never smoked. She has never used smokeless tobacco. She reports that she does not drink alcohol and does not use drugs.    Review of Systems  A review of at least 12 organ/systems was obtained by the medical assistant and reviewed by me, including additional positives/negatives:  A review of at least 12 organ/systems was evaluated and there are no complaints.    The total amount of time spent with the patient along with pre-chart and post-chart preparation was 27 minutes on the calendar day of the date of service.  This included history taking, physical exam, review of ancillary testing, counseling provided to the patient regarding diagnosis, medications, treatment, and risk management, and other communication to the patient's providers and/or family.  Start time: 10:08AM  End time: 10:35AM

## 2024-06-27 NOTE — PROGRESS NOTES
Review of Systems   Constitutional:  Negative for appetite change, fatigue and fever.   HENT: Negative.  Negative for hearing loss, tinnitus, trouble swallowing and voice change.    Eyes: Negative.  Negative for photophobia, pain and visual disturbance.   Respiratory: Negative.  Negative for shortness of breath.    Cardiovascular: Negative.  Negative for palpitations.   Gastrointestinal: Negative.  Negative for nausea and vomiting.   Endocrine: Negative.  Negative for cold intolerance.   Genitourinary: Negative.  Negative for dysuria, frequency and urgency.   Musculoskeletal:  Negative for back pain, gait problem, myalgias, neck pain and neck stiffness.   Skin: Negative.  Negative for rash.   Allergic/Immunologic: Negative.    Neurological: Negative.  Negative for dizziness, tremors, seizures, syncope, facial asymmetry, speech difficulty, weakness, light-headedness, numbness and headaches.   Hematological: Negative.  Does not bruise/bleed easily.   Psychiatric/Behavioral: Negative.  Negative for confusion, hallucinations and sleep disturbance.    All other systems reviewed and are negative.

## 2024-06-27 NOTE — PATIENT INSTRUCTIONS
Epilepsy, suspect underlying generalized epilepsy based on the history of absence and generalized tonic clonic seizures  - continue with levetiracetam 750mg twice a day  - call the office if you have a breakthrough seizure, convulsion, staring spell, or body jerking activity    Women of childbearing age should be on folic acid supplementation, but she reports that she is currently abstinent, levetiracetam generally has a lower risk for congenital malformations compared to other antiseizure medications.    Seizure protocol  If she has a seizure that last less than 5 minutes then allow her to recover at home; just get her to the ground for safety.  Call 911 if the following conditions apply  - unwitnessed onset of seizure and there is a potential head injury that needs to be evaluated  - patient stops breathing or turns blue during a seizure  - if the seizure is longer than 5 minutes  - if after the seizure ends and she does not show recovery over the course of 30 minutes.  - if there are multiple seizures in 24 hours  - if the patient refused taking her medications for the past 24 hours  - if there is physical injury from the seizure    Follow-up in 1 year.

## 2024-07-01 NOTE — ASSESSMENT & PLAN NOTE
- continue with levetiracetam 750mg twice a day  - call the office if you have a breakthrough seizure, convulsion, staring spell, or body jerking activity

## 2025-06-24 DIAGNOSIS — R56.9 SEIZURE (HCC): ICD-10-CM

## 2025-06-24 RX ORDER — LEVETIRACETAM 750 MG/1
750 TABLET ORAL 2 TIMES DAILY
Qty: 60 TABLET | Refills: 1 | Status: SHIPPED | OUTPATIENT
Start: 2025-06-24

## 2025-06-24 NOTE — TELEPHONE ENCOUNTER
Follow up scheduled 8/21/25    Patient needs updated blood work. Please place orders(CMP). A courtesy refill was provided.